# Patient Record
Sex: FEMALE | Race: OTHER | Employment: FULL TIME | URBAN - METROPOLITAN AREA
[De-identification: names, ages, dates, MRNs, and addresses within clinical notes are randomized per-mention and may not be internally consistent; named-entity substitution may affect disease eponyms.]

---

## 2018-07-25 ENCOUNTER — HOSPITAL ENCOUNTER (EMERGENCY)
Age: 33
Discharge: HOME | End: 2018-07-25
Payer: COMMERCIAL

## 2018-07-25 VITALS
SYSTOLIC BLOOD PRESSURE: 116 MMHG | OXYGEN SATURATION: 98 % | WEIGHT: 130 LBS | HEIGHT: 59 IN | RESPIRATION RATE: 16 BRPM | HEART RATE: 74 BPM | DIASTOLIC BLOOD PRESSURE: 68 MMHG | BODY MASS INDEX: 26.21 KG/M2 | TEMPERATURE: 98 F

## 2018-07-25 DIAGNOSIS — M79.18 MUSCULOSKELETAL PAIN: Primary | ICD-10-CM

## 2018-07-25 LAB
CHP ED QC CHECK: YES
PREGNANCY TEST URINE, POC: NEGATIVE

## 2018-07-25 PROCEDURE — 99283 EMERGENCY DEPT VISIT LOW MDM: CPT

## 2018-07-25 RX ORDER — NAPROXEN 500 MG/1
500 TABLET ORAL 2 TIMES DAILY
Qty: 14 TABLET | Refills: 0 | Status: SHIPPED | OUTPATIENT
Start: 2018-07-25 | End: 2018-07-30 | Stop reason: ALTCHOICE

## 2018-07-25 ASSESSMENT — PAIN DESCRIPTION - ORIENTATION: ORIENTATION: UPPER

## 2018-07-25 ASSESSMENT — PAIN DESCRIPTION - LOCATION: LOCATION: BACK

## 2018-07-25 ASSESSMENT — PAIN DESCRIPTION - PAIN TYPE: TYPE: ACUTE PAIN

## 2018-07-25 ASSESSMENT — PAIN SCALES - GENERAL: PAINLEVEL_OUTOF10: 10

## 2018-07-25 ASSESSMENT — PAIN DESCRIPTION - DESCRIPTORS: DESCRIPTORS: CONSTANT

## 2018-07-25 NOTE — ED PROVIDER NOTES
[facility]       Department of Emergency Medicine   ED  Provider Note  Admit Date/RoomTime: 7/25/2018  2:23 AM  ED Room: 18/18  MRN: [id number]  Chief Complaint: Back Pain (upper back started 2 weeks ago, denies injury)       History of Present Illness   Source of history provided by:  patient and spouse/SO. History/Exam Limitations: communication barrier Language, Visual  used for triage, physical examination and explanation of treatment plan. Hao Mejias is a 26 y.o. female who has a past medical history of: History reviewed. No pertinent past medical history. presents to the ED by private car and is accompanied by spouse for nontraumatic left-sided neck upper arm mid to upper back pain, beginning 2 weeks ago and are unchanged since that time. The complaint has been persistent, moderate in severity. She states that her job is very physical that she works every day of the week. She currently is without a primary care provider in the area. Her pain is made worse with any movement or direct pressure in the above-mentioned areas. She is denying any fever, chills, vomiting, abdominal pain or urinary complaints. Her last menstrual cycle was on time and regular. She did take a pregnancy test at home but thinks she still may be pregnant. There's been no chest pain or shortness of breath. ROS    Pertinent positives and negatives are stated within HPI, all other systems reviewed and are negative. History reviewed. No pertinent surgical history. Social History:  reports that she has never smoked. She has never used smokeless tobacco. She reports that she does not drink alcohol or use drugs. Family History: family history is not on file. Allergies: Patient has no known allergies.     Physical Exam   Oxygen Saturation Interpretation: Normal.   ED Triage Vitals [07/25/18 0231]   BP Temp Temp Source Pulse Resp SpO2 Height Weight   116/68 98 °F (36.7 °C) Oral 74 16 98 % 4' 11\" (1.499 m) 130 lb (59 kg)       Physical Exam  · Constitutional/General: Alert and oriented x3, well appearing, non toxic  · HEENT:  NC/NT. PERRLA,  Airway patent. · Neck: Supple, full ROM, non tender to palpation in the midline, no stridor, no crepitus, no meningeal signs  · Respiratory: Lungs clear to auscultation bilaterally, no wheezes, rales, or rhonchi. Not in respiratory distress  · CV:  Regular rate. Regular rhythm. No murmurs, gallops, or rubs. 2+ distal pulses  · Chest: No chest wall tenderness  · GI:  Abdomen Soft, Non tender, Non distended. +BS. No rebound, guarding, or rigidity. No pulsatile masses. · Musculoskeletal: Moves all extremities x 4. Warm and well perfused, no clubbing, cyanosis, or edema. Capillary refill <3 seconds. · Back: Mild tenderness throughout the mid thoracic muscular region without crepitus or wounds noted. Full range of motion. No midline tenderness. · Integument: skin warm and dry. No rashes. · Lymphatic: no lymphadenopathy noted  · Neurologic: GCS 15, no focal deficits, symmetric strength 5/5 in the upper and lower extremities bilaterally    Lab / Imaging Results   (All laboratory and radiology results have been personally reviewed by myself)  Labs:  Results for orders placed or performed during the hospital encounter of 07/25/18   POC Pregnancy Urine Qual   Result Value Ref Range    Preg Test, Ur negative     QC OK? yes      Imaging: All Radiology results interpreted by Radiologist unless otherwise noted. No orders to display       ED Course / Medical Decision Making   Medications - No data to display       MDM:   After discussion with  and the patient we will confirm the patient is not pregnant by checking her urine. We will recommend that she use medications prescribed, to not return to work for at least 2 days and contact the referral physician service number on her discharge papers tomorrow or Thursday. Counseling:     The emergency provider has

## 2018-07-30 ENCOUNTER — OFFICE VISIT (OUTPATIENT)
Dept: FAMILY MEDICINE CLINIC | Age: 33
End: 2018-07-30
Payer: COMMERCIAL

## 2018-07-30 VITALS
HEART RATE: 74 BPM | HEIGHT: 59 IN | OXYGEN SATURATION: 99 % | TEMPERATURE: 98.3 F | RESPIRATION RATE: 18 BRPM | SYSTOLIC BLOOD PRESSURE: 119 MMHG | DIASTOLIC BLOOD PRESSURE: 75 MMHG | WEIGHT: 133 LBS | BODY MASS INDEX: 26.81 KG/M2

## 2018-07-30 DIAGNOSIS — M25.512 ACUTE PAIN OF LEFT SHOULDER: Primary | ICD-10-CM

## 2018-07-30 DIAGNOSIS — R73.03 PRE-DIABETES: ICD-10-CM

## 2018-07-30 PROBLEM — E11.9 TYPE 2 DIABETES MELLITUS WITHOUT COMPLICATION, WITHOUT LONG-TERM CURRENT USE OF INSULIN (HCC): Status: ACTIVE | Noted: 2018-07-30

## 2018-07-30 LAB — HBA1C MFR BLD: 6.3 %

## 2018-07-30 PROCEDURE — 83036 HEMOGLOBIN GLYCOSYLATED A1C: CPT | Performed by: STUDENT IN AN ORGANIZED HEALTH CARE EDUCATION/TRAINING PROGRAM

## 2018-07-30 PROCEDURE — 99203 OFFICE O/P NEW LOW 30 MIN: CPT | Performed by: STUDENT IN AN ORGANIZED HEALTH CARE EDUCATION/TRAINING PROGRAM

## 2018-07-30 RX ORDER — NAPROXEN 500 MG/1
500 TABLET ORAL 2 TIMES DAILY WITH MEALS
Qty: 30 TABLET | Refills: 0 | Status: SHIPPED | OUTPATIENT
Start: 2018-07-30 | End: 2018-08-07 | Stop reason: ALTCHOICE

## 2018-07-30 ASSESSMENT — ENCOUNTER SYMPTOMS
WHEEZING: 0
COUGH: 0
CONSTIPATION: 0
EYE DISCHARGE: 0
SHORTNESS OF BREATH: 0
DOUBLE VISION: 0
DIARRHEA: 0
EYE REDNESS: 0
NAUSEA: 0
BLURRED VISION: 0
VOMITING: 0

## 2018-07-30 ASSESSMENT — PATIENT HEALTH QUESTIONNAIRE - PHQ9
SUM OF ALL RESPONSES TO PHQ9 QUESTIONS 1 & 2: 0
SUM OF ALL RESPONSES TO PHQ QUESTIONS 1-9: 0
1. LITTLE INTEREST OR PLEASURE IN DOING THINGS: 0
2. FEELING DOWN, DEPRESSED OR HOPELESS: 0

## 2018-07-30 NOTE — PROGRESS NOTES
[facility]  Department of Family Medicine  Family Medicine Residency Program      Patient:  Jayne Sharam 26 y.o. female     Date of Service: 7/30/18      Chief complaint:     New patient. Left sided neck and shoulder pain. History of Present Illness   The patient is a 26 y.o. female  presented to the clinic with complaints as above. Patient does not speak english well. A phone  was used for this entire visit.    -Shoulder pain started approximately 2 weeks ago. -Was seen in emergency room, prescribed naproxen and told not to work for 3 days. No imaging studies were done  -The pain has been getting better but she has to go back to work tomorrow and is worried the pain will worsen. Is wondering if she can get a note for more days off of work. -Pain is located on anterior shoulder and neck area and radiates down to her middle back. It is described as strong. 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 It also hurts when she is not working. Icing the area does help a little bit. The pain has lessened since taking some days off work  -Cannot think of  Any inciting event. This is the first time this has happened.    -Patient also states she has a remote history of diabetes, but she has never taken any medication and her blood sugars at home are routinely in the 110-120 range. A POC HbA1c taken at todays visit read 6.3. Past Medical History:      Diagnosis Date    Type 2 diabetes mellitus without complication ([facility] [address]        Past Surgical History:    No past surgical history on file. Allergies:    Patient has no known allergies. Social History:   Social History     Social History    Marital status:      Spouse name: N/A    Number of children: N/A    Years of education: N/A     Occupational History    Not on file. exhibits tenderness, pain and decreased strength. She exhibits normal range of motion, no swelling, no effusion, no deformity and no laceration. Arms:  There is left sided pain in the medial clavicular region that is most present when the shoulder is extended. The pain radiates to the upper middle back (The areas are marked in red)   Neurological: She is alert and oriented to person, place, and time. Psychiatric: She has a normal mood and affect. Her behavior is normal.           Assessment and Plan     1. Type 2 diabetes mellitus without complication, without long-term current use of insulin ([address]  Will discuss meds at next visit as well as retinal exam, foot exam and urine for microalbumin. Pt at 6.3 HbA1c currently. - POCT glycosylated hemoglobin (Hb A1C)  - Lipid, Fasting; Future  - COMPREHENSIVE METABOLIC PANEL; Future    2. Acute pain of left shoulder  -Ice 3-4 times daily for 20 minutes  -Scheduled to see me next week  -Shoulder strengthening exercises were printed off in [address] and given to the patient    - naproxen (NAPROSYN) 500 MG tablet; Take 1 tablet by mouth 2 times daily (with meals)  Dispense: 30 tablet; Refill: 0    3. Prevention : will continue to monitor blood sugar, A1c as well we discuss possible need for lipid lowering medication if lab results are positive. Counseled regarding above diagnosis, including possible risks and complications,  especially if left uncontrolled. Counseled regarding the possible side effects, risks, benefits and alternatives to treatment; patient and/or guardian verbalizes understanding, agrees, feels comfortable with and wishes to proceed with above treatment plan. Call or go to ED immediately if symptoms worsen or persist. Advised patient to call with any new medication issues, and, as applicable, read all Rx info from pharmacy to assure aware of all possible risks and side effects of medication before taking.      Patient and/or guardian given opportunity to ask questions/raise concerns. The patient verbalized comfort and understanding of instructions. I encourage further reading and education about your health conditions. Information on many health conditions is provided by the American Academy of Family Physicians: https://familydoctor. org/  Please bring any questions to me at your next visit. Return to Office: Return in about 1 week (around 8/6/2018). Medication List:    Current Outpatient Prescriptions   Medication Sig Dispense Refill    naproxen (NAPROSYN) 500 MG tablet Take 1 tablet by mouth 2 times daily (with meals) 30 tablet 0     No current facility-administered medications for this visit. Lupe Grider MD       This document may have been prepared at least partially through the use of voice recognition software. Although effort is taken to assure the accuracy of this document, it is possible that grammatical, syntax,  or spelling errors may occur.

## 2018-07-30 NOTE — PATIENT INSTRUCTIONS
Please take medicine every day twice a day  Please ice the area three times a day    Patient Education        Omóplato: Ejercicios - [ Shoulder Blade: Exercises ]  Instrucciones de cuidado  Aquí hay algunos ejemplos de ejercicios típicos para rios afección. Comience cada ejercicio lentamente. Reduzca la intensidad del ejercicio si Irene Forge a sentir dolor. Rios médico o el fisioterapeuta le dirán cuándo puede comenzar con estos ejercicios y cuáles funcionarán mejor para usted. Cómo hacer los ejercicios  Rotación de hombros    1. Párese erguido con el mentón ligeramente hacia adentro. Imagine que tiene giovanni cuerda en la parte superior de la giovanni que lo está jalando en forma recta [place] arriba. 2. Mantenga los brazos relajados. Todo el movimiento estará en los hombros.  3. [place] de hombros llevándolos hacia las [place], luego hacia [place] y [place] atrás. [place] un círculo con los hombros hacia abajo y [place] atrás mayra si estuviera deslizando las hubert en los bolsillos traseros de los pantalones. 4. Repita los círculos al menos de 2 a 4 veces. 5. Karena ejercicio también es útil toda vez que desee relajarse. Estiramiento de la base del elsie y parte superior de la espalda    1. Con los brazos aproximadamente a la altura de los hombros, junte las hubert frente a usted. 2. [address]. 3. Estírese hacia adelante de modo que redondee la parte superior de la espalda. Piense que está separando los omóplatos. Sentirá un estiramiento a lo ancho de la parte superior de la espalda y los hombros. Mantenga la posición por al menos 6 segundos. 4. Repita de 2 a 4 veces. Estiramiento del tríceps    1. Estire el brazo [place] arriba. 2. Con el codo en el lugar, flexione el brazo y [employer] mano hacia abajo por detrás de la espalda. 3. Con la otra mano, presione suavemente el codo flexionado. Sentirá un estiramiento en el lado de atrás de la parte superior del Junita [place] y el hombro.  Mantenga la posición por (CABG por xenia siglas en inglés). Naproxen también puede causar sangrado del estómago o intestino, lo que podría ser fatal. Estas condiciones pueden ocurrir sin ninguna advertencia mientras esté usando naproxen, especialmente en [place]. ¿Qué es naproxen? Naproxen es giovanni droga antiinflamatoria no esteroide (DAKOTAH; NSAID por xenia siglas en inglés). Naproxen funciona reduciendo las hormonas que causan inflamación y dolor en el cuerpo. Naproxen se [place] para tratar el dolor o la inflamación causada por condiciones mayra artritis, espondilitis anquilosante, bursitis, tendinitis, gota, o cólicos menstruales. Las tabletas de liberación retardada o de liberación prolongada son formas de naproxen de acción lenta que sólo se usan para el tratamiento de enfermedades crónicas mayra la artritis o la espondilitis anquilosante. Estas formas de naproxen no funcionan suficientemente rápido para tratar el dolor shiloh. Naproxen puede también usarse para fines no mencionados en esta guía del medicamento. ¿Qué debería discutir con el profesional de la nish antes de dexter naproxen? Naproxen puede aumentar rios riesgo de ataques al corazón o accidentes cerebrovasculares fatales, especialmente si usted la [place] por sriram tiempo o louie dosis elevadas, o si usted tiene enfermedad del corazón. Aun las personas sin enfermedad del corazón o factores de riesgo podrían tener un accidente cerebrovascular o ataque al corazón mientras [address] No use esta medicina jv antes o después de [place] de bauer (bypass) aortocoronario con injerto (CABG por xenia siglas en inglés). Naproxen también puede causar sangrado del estómago o intestino, lo que podría ser fatal. Estas condiciones pueden ocurrir sin ninguna advertencia mientras esté usando naproxen, especialmente en [place].   Usted no debe usar naproxen si es alérgico a éste, o si usted alguna vez ha tenido un ataque de asma o reacción alérgica severa [place] y por lo cual [employer] no certifica que el uso fuera de los [place] sea apropiado, a menos que se mencione específicamente lo cual. La información de [employer] sobre drogas no sanciona drogas, ni diagnóstica al paciente o recomienda terapia. La información de [employer] sobre drogas sirve mayra giovanni brendon de información diseñada para la ayuda del profesional de la nish licenciado en el cuidado de xenia pacientes y/o para servir al consumidor que reciba amy servicio mayra un suplemento a, y no mayra sustituto de la competencia, experiencia, conocimiento y opinión del profesional de la nish. La ausencia en éste de giovanni advertencia para giovanni droga o combinación de drogas no debe, de ninguna forma, interpretarse mayra que la droga o la combinación de drogas sonal seguras, [place], o apropiadas para cualquier paciente. [employer] no se responsabiliza por ningún aspecto del cuidado médico que reciba con la ayuda de la información que proviene de Tea ayala. La información incluida aquí no se ha creado con la intención de cubrir todos los usos posibles, instrucciones, precauciones, advertencias, interacciones con otras drogas, reacciones alérgicas, o efectos secundarios. Si usted tiene alguna pregunta acerca de las drogas que está tomando, consulte con rios médico, [place], o farmacéutico.  Copyright 8092-2006 [employer] Version: 14.01. Revision date: 3/14/2017. Las instrucciones de cuidado fueron adaptadas bajo licencia por [facility] ([facility]). Si usted tiene [place] afección médica o sobre estas instrucciones, siempre pregunte a rios profesional de nish. [facility] niega toda garantía o responsabilidad por rios uso de esta información. Patient Education        Dorise Pilsner de las pautas alimentarias para diabetes - [ Learning About Diabetes Food Guidelines ]  Instrucciones de cuidado    La planificación de las comidas es importante para el manejo de la diabetes.  Ayuda a mantener el azúcar en la shania en el nivel ideal (que usted fija con rios médico). Usted no tiene que comer alimentos especiales. Puede comer lo mismo que rios nabil, incluso dulces de vez en cuando. Cuong debe prestar atención a la cantidad y la frecuencia con que come ciertos alimentos. Sal vez desee colaborar con un dietista o educador de diabetes certificado (CDE, por xenia siglas en inglés) para que le ayuden a planificar las comidas y los refrigerios. Un dietista o CDE también puede ayudarle a bajar de peso si gely es deanna de xenia objetivos. ¿Qué debería saber acerca de ingerir carbohidratos? Manejar la cantidad de carbohidratos que ingiere es giovanni parte importante de la alimentación saludable cuando tiene diabetes. Los carbohidratos se encuentran en muchos alimentos. · Sepa qué alimentos contienen carbohidratos. Y aprenda qué cantidad de carbohidratos contienen los diferentes alimentos. ¨ El pan, los cereales, la pasta y el arroz tienen aproximadamente 15 gramos de carbohidratos por porción. Giovanni porción equivale a 1 rebanada de pan (1 onza o 28 g), ½ taza de cereal cocido o 1/3 de taza de pasta o arroz cocidos. ¨ Las frutas tienen 15 gramos de carbohidratos por porción. Marcelene Jessa porción es 1 fruta fresca pequeña, mayra giovanni Corpus diogenes o giovanni naranja; ½ banana (plátano); ½ taza de fruta cocida o enlatada; ½ taza de jugo de fruta; 1 taza de melón o frambuesas; o 2 cucharadas de frutas secas. ¨ La leche y el yogur sin azúcar agregado tienen 15 gramos de carbohidratos por porción. Marcelene Jessa porción es 1 taza de [place] o 2/3 taza de yogur sin azúcar agregado. ¨ Las verduras con almidón tienen 15 gramos de carbohidratos por porción. Giovanni porción es ½ taza de puré de papa o camote (batata, boniato); 1 taza de calabacín; ½ papa horneada pequeña; ½ taza de frijoles cocidos; o ½ taza de maíz (elote) o arvejas (chícharos) cocidos. · Aprenda cuántos carbohidratos debe consumir cada día y en cada comida.  Un dietista o CDE le puede enseñar cómo llevar la cuenta de los carbohidratos que consume. A esto se le llama recuento de carbohidratos. · Si no está seguro de cómo [place] gramos de carbohidratos, utilice el [place] para planificar las comidas. Es giovanni Buel Mattes y rápida de asegurarse de que consuma comidas equilibradas. También le ayuda a distribuir los carbohidratos layne el día. ¨ Divida el plato por tipo de alimento. Llene medio plato con verduras sin almidón, ponga carne u otras proteínas en giovanni cuarta parte del plato y granos o verduras con almidón en el último cuarto del plato. A esto puede agregarle un pequeño pedazo de fruta y 3 taza de [place] o yogur, según la cantidad de carbohidratos que deba consumir en giovanni comida. · Trate de comer aproximadamente la misma cantidad de carbohidratos en cada comida. No \"reserve\" rios cantidad diaria de carbohidratos para consumirlos en giovanni lilli comida. · Las proteínas contienen muy pocos o nada de carbohidratos por porción. Los ejemplos de proteínas incluyen carne de res, [place], [place], [place], [place], tofu, [place], requesón (\"cottage cheese\") y la mantequilla de cacahuate [place]). Giovanni porción de carne son 3 onzas (85 g), lo cual es aproximadamente del tamaño de giovanni baraja de naipes. Los ejemplos de porciones de sustitutos de la carne (equivalente a 1 onza o 28 g de carne) son 1/4 de taza de requesón, 1 huevo, 1 cucharada de [place] de cacahuate y ½ taza de tofu. ¿Cómo puede comer fuera y aún así comer de modo saludable? · Aprenda a calcular los tamaños de las porciones de alimentos que contienen carbohidratos. Si mide la comida en casa, será más fácil calcular la cantidad en giovanni porción de comida de restaurante. · Si el platillo que pide contiene demasiados carbohidratos (mayra vanessa, maíz o frijoles al horno), pida un alimento bajo en carbohidratos en rios lugar. Pida giovanni ensalada o verduras.   · Si [place] insulina, revise rios azúcar en la shania antes y después de comer fuera para ayudarle a planear

## 2018-07-30 NOTE — LETTER
[address] [place] [address]  Phone: [phone]  Fax: [phone]    Fariha Barahona MD        July 30, 2018     Patient: Danilo Rojas   YOB: 1985   Date of Visit: 7/30/2018       To Whom it May Concern:    Hollie Duarte was seen in my clinic on 7/30/2018. She may return to work on 8/8/2018. If you have any questions or concerns, please don't hesitate to call.     Sincerely,         Fariha Barahona MD

## 2018-07-31 PROBLEM — R73.03 PRE-DIABETES: Status: ACTIVE | Noted: 2018-07-31

## 2018-08-07 ENCOUNTER — OFFICE VISIT (OUTPATIENT)
Dept: FAMILY MEDICINE CLINIC | Age: 33
End: 2018-08-07
Payer: COMMERCIAL

## 2018-08-07 VITALS
HEIGHT: 59 IN | DIASTOLIC BLOOD PRESSURE: 73 MMHG | OXYGEN SATURATION: 98 % | WEIGHT: 130 LBS | RESPIRATION RATE: 16 BRPM | SYSTOLIC BLOOD PRESSURE: 121 MMHG | HEART RATE: 66 BPM | BODY MASS INDEX: 26.21 KG/M2

## 2018-08-07 DIAGNOSIS — R73.03 PRE-DIABETES: Primary | ICD-10-CM

## 2018-08-07 DIAGNOSIS — M25.519 NECK AND SHOULDER PAIN: ICD-10-CM

## 2018-08-07 DIAGNOSIS — M54.2 NECK AND SHOULDER PAIN: ICD-10-CM

## 2018-08-07 PROCEDURE — 99213 OFFICE O/P EST LOW 20 MIN: CPT | Performed by: STUDENT IN AN ORGANIZED HEALTH CARE EDUCATION/TRAINING PROGRAM

## 2018-08-07 RX ORDER — NAPROXEN 375 MG/1
375 TABLET ORAL 2 TIMES DAILY PRN
Qty: 60 TABLET | Refills: 3 | Status: SHIPPED | OUTPATIENT
Start: 2018-08-07 | End: 2018-09-10 | Stop reason: SINTOL

## 2018-08-07 NOTE — PROGRESS NOTES
[facility] [address]  Precepting Note    Subjective:  DM  Previously was checking BS and consistently in low 100s, now no longer checking. Lab Results   Component Value Date    LABA1C 6.3 07/30/2018   She is declining further blood work. Shoulder pain. Had to stop working. She was held off work for a while and is being seen today for follow up. Tx with naproxen, ice, exercises. Shoulder pain has improved. No pain with int or ext rot. Also no pain with abduction against resistance. Able to abduction completely without pain - which was previously causing pain. Repetitive motions at work. Some point ttp over anterior trapezius. No palpable abnormalities. She would like more naproxen and to follow up in 1 month. ROS otherwise negative     Past medical, surgical, family and social history were reviewed, non-contributory, and unchanged unless otherwise stated. Objective:    /73   Pulse 66   Resp 16   Ht 4' 11\" (1.499 m)   Wt 130 lb (59 kg)   LMP 07/27/2018   SpO2 98%   BMI 26.26 kg/m²     Exam is as noted by resident with the following changes, additions or corrections:    General:  NAD; alert & oriented x 3   Heart:  RRR, no murmurs, gallops, or rubs. Lungs:  CTA bilaterally, no wheeze, rales or rhonchi  Abd: bowel sounds present, nontender, nondistended, no masses  Extrem:  No clubbing, cyanosis, or edema    Full rom of left shoulder and some mild ttp and muscle spasm over left trapezius on my exam.     Assessment/Plan:  Left trapezius spasm. Improved. May return to work. No restrictions. Lower dose of naproxen. Prediabetes  Counseled on diet and exercise. Declines further lab workup. Seen with Dr. Iman Kline     Attending Physician Statement  I have reviewed the chart, including any radiology or labs. I have discussed the case, including pertinent history and exam findings with the resident.   I agree with the assessment, plan and orders as documented by the resident. Please refer to the resident note for additional information.       Electronically signed by Suzette Barker MD on 8/7/2018 at 9:25 AM

## 2018-08-07 NOTE — Clinical Note
Herlinda Hutson - you have to remove all the ** *s from your note before I can close. It.  Open your note and Hit F2 to find them quickly.

## 2018-08-07 NOTE — PATIENT INSTRUCTIONS
Please take medication as needed for pain. Also use ice on shoulder for 20-30 minutes after work.  Follow up in one month

## 2018-08-07 NOTE — PROGRESS NOTES
[facility]  Department of [facility] Medicine  Family Medicine Residency Program      Patient:  Marion Jones 26 y.o. female     Date of Service: 8/7/18      Chief complaint:   Chief Complaint   Patient presents with    Shoulder Pain     follow up         History of Present Illness   The patient is a 26 y.o. female  presented to the clinic with complaints as above. Ms. Elie Carbajal does not speak english. A  phone  was used for the entire exam and history taking. Shoulder pain  -Patient reports shoulder pain has not resolved but is better.   -Patient is able to adduct, extend, flex, internally and externally rotate without pain. She is also able to adduct against resistance without pain  -Point tenderness remains on the L trapezius near the mid clavicle  -Reports thats naprosyn and ice is helping her pain.  -She feels as if the shoulder is healthy enough to return to work. Prediabetes  Previus A1c of 6.3 one week ago. Discussed avoiding sugar and exercising. Vitals  Vitals:    08/07/18 0857   BP: 121/73   Pulse: 66   Resp: 16   SpO2: 98%         Past Medical History:      Diagnosis Date    Type 2 diabetes mellitus without complication ([address]        Past Surgical History:    No past surgical history on file. Allergies:    Patient has no known allergies. Social History:   Social History     Social History    Marital status:      Spouse name: N/A    Number of children: N/A    Years of education: N/A     Occupational History    Not on file. Social History Main Topics    Smoking status: Never Smoker    Smokeless tobacco: Never Used    Alcohol use No    Drug use: No    Sexual activity: Not on file     Other Topics Concern    Not on file     Social History Narrative    No narrative on file        Family History:   No family history on file.     Review of Systems:   ROS    Physical Exam   Vitals: /73   Pulse 66   Resp 16   Ht 4' 11\" (1.499 m)   Wt 130 lb (59 kg)   LMP 07/27/2018   SpO2 98%   BMI 26.26 kg/m²   General Appearance: Well developed, awake, alert, oriented, no acute distress  HEENT: Normocephalic, atraumatic. Neck: Supple, symmetrical, trachea midline. No JVD. Chest wall/Lung: Clear to auscultation bilaterally,  respirations unlabored. No ronchi/wheezing/rales  Heart[de-identified]  Regular rate and rhythm, S1 and S2 normal, no murmur, rub or gallop.  , bowel sounds normoactive, no masses, no organomegaly  Extremities:  Extremities normal, atraumatic, no cyanosis. Musculokeletal: ROM grossly normal in all joints of extremities, including the L shoulder and neck. Mild point tenderness located on the L trapezius above the clavicle    Lymph nodes: no lymph node enlargement appreciated  Neurologic:   Alert&Oriented. Normal gait and coordination           Psychiatric: has a normal mood and affect. Behavior is normal.       Assessment and Plan       1. Neck and shoulder pain    - naproxen (NAPROSYN) 375 MG tablet; Take 1 tablet by mouth 2 times daily as needed for Pain  Dispense: 60 tablet; Refill: 3   -Pharmacy was called and instructed to change the refill amount to 0  -patient will return to work with normal activities, was instructed to ice her shoulder and neck after work. -scheduled for a 4 week follow up. Instructed to make appointment sooner if condition worsens    2. Pre-diabetes    Counseled patient regarding diet and exercise. Plan to check A1c again in the future. Counseled regarding above diagnosis, including possible risks and complications,  especially if left uncontrolled. Counseled regarding the possible side effects, risks, benefits and alternatives to treatment; patient and/or guardian verbalizes understanding, agrees, feels comfortable with and wishes to proceed with above treatment plan.     Call or go to ED immediately if symptoms worsen or persist. Advised patient to call with any new medication issues, and, as

## 2018-09-10 ENCOUNTER — OFFICE VISIT (OUTPATIENT)
Dept: FAMILY MEDICINE CLINIC | Age: 33
End: 2018-09-10
Payer: COMMERCIAL

## 2018-09-10 VITALS
SYSTOLIC BLOOD PRESSURE: 114 MMHG | WEIGHT: 133 LBS | HEART RATE: 73 BPM | OXYGEN SATURATION: 98 % | HEIGHT: 59 IN | RESPIRATION RATE: 16 BRPM | BODY MASS INDEX: 26.81 KG/M2 | DIASTOLIC BLOOD PRESSURE: 73 MMHG

## 2018-09-10 DIAGNOSIS — M25.512 ACUTE PAIN OF LEFT SHOULDER: Primary | ICD-10-CM

## 2018-09-10 PROCEDURE — 99213 OFFICE O/P EST LOW 20 MIN: CPT | Performed by: STUDENT IN AN ORGANIZED HEALTH CARE EDUCATION/TRAINING PROGRAM

## 2018-09-10 RX ORDER — CYCLOBENZAPRINE HCL 10 MG
10 TABLET ORAL DAILY
Qty: 10 TABLET | Refills: 0 | Status: SHIPPED | OUTPATIENT
Start: 2018-09-10 | End: 2018-09-20

## 2018-09-10 RX ORDER — MELOXICAM 15 MG/1
15 TABLET ORAL DAILY
Qty: 30 TABLET | Refills: 1 | Status: SHIPPED | OUTPATIENT
Start: 2018-09-10

## 2018-09-10 ASSESSMENT — ENCOUNTER SYMPTOMS
SHORTNESS OF BREATH: 0
CHEST TIGHTNESS: 0

## 2018-09-10 NOTE — PROGRESS NOTES
person, place, and time. She has normal strength. No cranial nerve deficit or sensory deficit. Sensation and motor function of the entire L arm, shoulder, and neck remain unaffected       Assessment:      Muscular strain and spasm in the L neck and shoulder area      Plan:      Aster Eason was seen today for shoulder pain.  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 She stated she  had a family member who would do that for her      Diagnoses and all orders for this visit:    Acute pain of left shoulder    Other orders  -     meloxicam (MOBIC) 15 MG tablet; Take 1 tablet by mouth daily  -     cyclobenzaprine (FLEXERIL) 10 MG tablet;  Take 1 tablet by mouth daily for 10 days Take 1 tablet before bed            Hermilo Floyd MD

## 2018-12-03 ENCOUNTER — HOSPITAL ENCOUNTER (EMERGENCY)
Age: 33
Discharge: HOME | End: 2018-12-03
Attending: EMERGENCY MEDICINE
Payer: COMMERCIAL

## 2018-12-03 VITALS
HEART RATE: 86 BPM | SYSTOLIC BLOOD PRESSURE: 122 MMHG | DIASTOLIC BLOOD PRESSURE: 63 MMHG | BODY MASS INDEX: 31.49 KG/M2 | TEMPERATURE: 97.8 F | WEIGHT: 140 LBS | OXYGEN SATURATION: 100 % | RESPIRATION RATE: 18 BRPM | HEIGHT: 56 IN

## 2018-12-03 DIAGNOSIS — J20.9 ACUTE BRONCHITIS, UNSPECIFIED ORGANISM: Primary | ICD-10-CM

## 2018-12-03 PROCEDURE — 6360000002 HC RX W HCPCS: Performed by: EMERGENCY MEDICINE

## 2018-12-03 PROCEDURE — 99282 EMERGENCY DEPT VISIT SF MDM: CPT

## 2018-12-03 PROCEDURE — 6370000000 HC RX 637 (ALT 250 FOR IP): Performed by: EMERGENCY MEDICINE

## 2018-12-03 PROCEDURE — 96372 THER/PROPH/DIAG INJ SC/IM: CPT

## 2018-12-03 RX ORDER — GUAIFENESIN AND DEXTROMETHORPHAN HYDROBROMIDE 1200; 60 MG/1; MG/1
1 TABLET, EXTENDED RELEASE ORAL 2 TIMES DAILY PRN
Qty: 28 TABLET | Refills: 0 | Status: SHIPPED | OUTPATIENT
Start: 2018-12-03

## 2018-12-03 RX ORDER — AZITHROMYCIN 250 MG/1
250 TABLET, FILM COATED ORAL DAILY
Qty: 4 TABLET | Refills: 0 | Status: SHIPPED | OUTPATIENT
Start: 2018-12-03 | End: 2018-12-07

## 2018-12-03 RX ORDER — KETOROLAC TROMETHAMINE 10 MG/1
10 TABLET, FILM COATED ORAL EVERY 8 HOURS PRN
Qty: 15 TABLET | Refills: 0 | Status: SHIPPED | OUTPATIENT
Start: 2018-12-03

## 2018-12-03 RX ORDER — KETOROLAC TROMETHAMINE 30 MG/ML
30 INJECTION, SOLUTION INTRAMUSCULAR; INTRAVENOUS ONCE
Status: COMPLETED | OUTPATIENT
Start: 2018-12-03 | End: 2018-12-03

## 2018-12-03 RX ORDER — AZITHROMYCIN 250 MG/1
500 TABLET, FILM COATED ORAL ONCE
Status: COMPLETED | OUTPATIENT
Start: 2018-12-03 | End: 2018-12-03

## 2018-12-03 RX ADMIN — HYDROCODONE BITARTRATE AND HOMATROPINE METHYLBROMIDE 5 ML: 5; 1.5 SOLUTION ORAL at 05:39

## 2018-12-03 RX ADMIN — KETOROLAC TROMETHAMINE 30 MG: 30 INJECTION, SOLUTION INTRAMUSCULAR at 05:39

## 2018-12-03 RX ADMIN — AZITHROMYCIN 500 MG: 250 TABLET, FILM COATED ORAL at 05:40

## 2018-12-03 ASSESSMENT — PAIN DESCRIPTION - LOCATION: LOCATION: HEAD

## 2018-12-03 ASSESSMENT — PAIN SCALES - GENERAL
PAINLEVEL_OUTOF10: 8
PAINLEVEL_OUTOF10: 8

## 2018-12-03 ASSESSMENT — PAIN DESCRIPTION - DESCRIPTORS: DESCRIPTORS: PATIENT UNABLE TO DESCRIBE

## 2018-12-03 NOTE — LETTER
[facility] Emergency Department  [address] [place] [address]  Phone: [phone]             December 3, 2018    Patient: Kaylyn Rhodes   YOB: 1985   Date of Visit: 12/3/2018       To Whom It May Concern:    Dorys Pinto was seen and treated in our emergency department on 12/3/2018.  She may return to work on 12/04/2018      Sincerely,             Signature:__________________________________

## 2018-12-03 NOTE — ED PROVIDER NOTES
HPI:  12/3/18,   Time: 5:13 AM         Omero Ghosh is a 26 y.o. female presenting to the ED for cough and cold and congestion and facial pain, beginning 3 days ago. The complaint has been constant, moderate in severity, and worsened by nothing. Gradual onset of progressive symptoms, productive cough of green sputum, no fever or chills, no history of asthma    ROS:   Pertinent positives and negatives are stated within HPI, all other systems reviewed and are negative.  --------------------------------------------- PAST HISTORY ---------------------------------------------  Past Medical History:  has a past medical history of Type 2 [facility] Past Surgical History:  has no past surgical history on file. Social History:  reports that she has never smoked. She has never used smokeless tobacco. She reports that she does not drink alcohol or use drugs. Family History: family history is not on file. The patients home medications have been reviewed. Allergies: Patient has no known allergies. -------------------------------------------------- RESULTS -------------------------------------------------  All laboratory and radiology results have been personally reviewed by myself   LABS:  No results found for this visit on 12/03/18. RADIOLOGY:  Interpreted by Radiologist.  No orders to display       ------------------------- NURSING NOTES AND VITALS REVIEWED ---------------------------   The nursing notes within the ED encounter and vital signs as below have been reviewed. There were no vitals taken for this visit.   Oxygen Saturation Interpretation: Normal      ---------------------------------------------------PHYSICAL EXAM--------------------------------------      Constitutional/General: Alert and oriented x3, well appearing, non toxic in NAD  Head: NC/AT  ENT: Mild bilateral frontal and maxillary sinus tenderness to palpation, moderate erythema of the posterior